# Patient Record
Sex: MALE | Race: BLACK OR AFRICAN AMERICAN | NOT HISPANIC OR LATINO | Employment: OTHER | URBAN - METROPOLITAN AREA
[De-identification: names, ages, dates, MRNs, and addresses within clinical notes are randomized per-mention and may not be internally consistent; named-entity substitution may affect disease eponyms.]

---

## 2018-03-05 ENCOUNTER — HOSPITAL ENCOUNTER (EMERGENCY)
Facility: HOSPITAL | Age: 83
Discharge: HOME | End: 2018-03-05
Attending: EMERGENCY MEDICINE
Payer: MEDICARE

## 2018-03-05 VITALS
TEMPERATURE: 99 F | SYSTOLIC BLOOD PRESSURE: 142 MMHG | OXYGEN SATURATION: 98 % | RESPIRATION RATE: 17 BRPM | BODY MASS INDEX: 29.29 KG/M2 | WEIGHT: 210 LBS | DIASTOLIC BLOOD PRESSURE: 88 MMHG | HEART RATE: 70 BPM

## 2018-03-05 DIAGNOSIS — R42 DIZZINESS: ICD-10-CM

## 2018-03-05 DIAGNOSIS — R42 LIGHTHEADEDNESS: Primary | ICD-10-CM

## 2018-03-05 LAB
ALBUMIN SERPL BCP-MCNC: 3.1 G/DL
ALP SERPL-CCNC: 64 U/L
ALT SERPL W/O P-5'-P-CCNC: 10 U/L
ANION GAP SERPL CALC-SCNC: 10 MMOL/L
AST SERPL-CCNC: 20 U/L
BACTERIA #/AREA URNS HPF: ABNORMAL /HPF
BASOPHILS # BLD AUTO: 0.1 K/UL
BASOPHILS NFR BLD: 0.9 %
BILIRUB SERPL-MCNC: 0.4 MG/DL
BILIRUB UR QL STRIP: NEGATIVE
BNP SERPL-MCNC: 184 PG/ML
BUN SERPL-MCNC: 17 MG/DL
CALCIUM SERPL-MCNC: 9 MG/DL
CHLORIDE SERPL-SCNC: 108 MMOL/L
CLARITY UR: CLEAR
CO2 SERPL-SCNC: 20 MMOL/L
COLOR UR: YELLOW
CREAT SERPL-MCNC: 1.1 MG/DL
DIFFERENTIAL METHOD: ABNORMAL
EOSINOPHIL # BLD AUTO: 0.1 K/UL
EOSINOPHIL NFR BLD: 1.1 %
ERYTHROCYTE [DISTWIDTH] IN BLOOD BY AUTOMATED COUNT: 16.3 %
EST. GFR  (AFRICAN AMERICAN): >60 ML/MIN/1.73 M^2
EST. GFR  (NON AFRICAN AMERICAN): 58 ML/MIN/1.73 M^2
GLUCOSE SERPL-MCNC: 83 MG/DL
GLUCOSE UR QL STRIP: NEGATIVE
HCT VFR BLD AUTO: 37.2 %
HGB BLD-MCNC: 12.2 G/DL
HGB UR QL STRIP: ABNORMAL
KETONES UR QL STRIP: NEGATIVE
LEUKOCYTE ESTERASE UR QL STRIP: NEGATIVE
LYMPHOCYTES # BLD AUTO: 1.9 K/UL
LYMPHOCYTES NFR BLD: 29.2 %
MCH RBC QN AUTO: 29 PG
MCHC RBC AUTO-ENTMCNC: 32.8 G/DL
MCV RBC AUTO: 88 FL
MICROSCOPIC COMMENT: ABNORMAL
MONOCYTES # BLD AUTO: 0.5 K/UL
MONOCYTES NFR BLD: 7.7 %
NEUTROPHILS # BLD AUTO: 3.9 K/UL
NEUTROPHILS NFR BLD: 61.1 %
NITRITE UR QL STRIP: NEGATIVE
PH UR STRIP: 6 [PH] (ref 5–8)
PLATELET # BLD AUTO: 235 K/UL
PMV BLD AUTO: 7.7 FL
POTASSIUM SERPL-SCNC: 4.4 MMOL/L
PROT SERPL-MCNC: 6.7 G/DL
PROT UR QL STRIP: NEGATIVE
RBC # BLD AUTO: 4.21 M/UL
RBC #/AREA URNS HPF: 3 /HPF (ref 0–4)
SODIUM SERPL-SCNC: 138 MMOL/L
SP GR UR STRIP: 1.02 (ref 1–1.03)
SQUAMOUS #/AREA URNS HPF: 2 /HPF
TROPONIN I SERPL DL<=0.01 NG/ML-MCNC: <0.006 NG/ML
URN SPEC COLLECT METH UR: ABNORMAL
UROBILINOGEN UR STRIP-ACNC: NEGATIVE EU/DL
WBC # BLD AUTO: 6.4 K/UL
WBC #/AREA URNS HPF: 2 /HPF (ref 0–5)

## 2018-03-05 PROCEDURE — 25000003 PHARM REV CODE 250: Performed by: EMERGENCY MEDICINE

## 2018-03-05 PROCEDURE — 96360 HYDRATION IV INFUSION INIT: CPT

## 2018-03-05 PROCEDURE — 36415 COLL VENOUS BLD VENIPUNCTURE: CPT

## 2018-03-05 PROCEDURE — 80053 COMPREHEN METABOLIC PANEL: CPT

## 2018-03-05 PROCEDURE — 93005 ELECTROCARDIOGRAM TRACING: CPT

## 2018-03-05 PROCEDURE — 99284 EMERGENCY DEPT VISIT MOD MDM: CPT | Mod: 25

## 2018-03-05 PROCEDURE — 85025 COMPLETE CBC W/AUTO DIFF WBC: CPT

## 2018-03-05 PROCEDURE — 96361 HYDRATE IV INFUSION ADD-ON: CPT

## 2018-03-05 PROCEDURE — 84484 ASSAY OF TROPONIN QUANT: CPT

## 2018-03-05 PROCEDURE — 81000 URINALYSIS NONAUTO W/SCOPE: CPT

## 2018-03-05 PROCEDURE — 83880 ASSAY OF NATRIURETIC PEPTIDE: CPT

## 2018-03-05 RX ORDER — DOXEPIN HYDROCHLORIDE 25 MG/1
25 CAPSULE ORAL NIGHTLY
COMMUNITY

## 2018-03-05 RX ADMIN — SODIUM CHLORIDE 1000 ML: 0.9 INJECTION, SOLUTION INTRAVENOUS at 01:03

## 2018-03-05 NOTE — ED NOTES
Dr. Kennedy is at the bedside of patient to give an update. Family is inquiring about self pay transport by EMS to go home.

## 2018-03-05 NOTE — ED NOTES
Upon discharge, patient is AAOx4, no cardiac or respiratory complications. Follow up care reviewed with patient and has been instructed to return to the ER if needed. Patient verbalized understanding and was assisted to car via wheel chair. YING CARTER

## 2018-03-05 NOTE — ED NOTES
"Presents to the ER with c/o dizziness that started this morning upon waking. Family who are at the bedside reports that patient was unable to get out of bed this morning because "He felt woozy." Patient uses a wheelchair daily, he is unable to ambulate. Patient is [place]. AAOx3. Bilateral hand  are strong and equal. Mucous membranes are pink and dry. Decreased skin tugor.  Skin is warm, dry and intact. Lungs are clear bilaterally, respirations are regular and unlabored. Denies cough, congestion, rhinorrhea or SOB. BS active x4, no tenderness with palpation, abd is soft and not distended. Denies any appetite or activity change. S1S2, capillary refill is < 2 seconds. Denies dysuria, difficulty urinating, frequency, numbness, tingling or weakness. EMMA HE    Patient arrived to ED with a dry brief in place.   "

## 2018-03-05 NOTE — ED PROVIDER NOTES
"Encounter Date: 3/5/2018    SCRIBE #1 NOTE: I, Angeli Batista, am scribing for, and in the presence of, Dr. Kennedy .       History     Chief Complaint   Patient presents with    Dizziness     s/s since this AM - "slept in" per family, then stated he was "woozy" (interpreted as dizzy by family member this AM) and hestitant to get out of bed       03/05/2018 12:33 PM     Chief complaint: Dizziness       Juan C Black is a 89 y.o. male with a hx of HTN, GERD, HLD, CA, and CAD who presents to the ED with complaints of "lightheaded" dizziness worse this morning. Per relative, pt complained of "feeling woozy" and was weaker than baseline while lying in bed. Pt uses a wheelchair and did not have enough strength to get into his wheelchair this morning. Pt denies CP, nausea, and vomiting. HPI limited secondary to pt is a poor historian. NKDA noted.       The history is provided by the patient.     Review of patient's allergies indicates:  No Known Allergies  Past Medical History:   Diagnosis Date    Cancer     prostate cancer    Coronary artery disease     GERD (gastroesophageal reflux disease)     Hyperlipidemia     Hypertension     Seasonal allergies      Past Surgical History:   Procedure Laterality Date    CARDIAC SURGERY      triple bypass    PROSTATECTOMY       History reviewed. No pertinent family history.  Social History   Substance Use Topics    Smoking status: Former Smoker     Types: Cigars    Smokeless tobacco: Not on file    Alcohol use Yes      Comment: socially     Review of Systems   Constitutional: Negative for fever.   HENT: Negative for sore throat.    Eyes: Negative for redness.   Respiratory: Negative for shortness of breath.    Cardiovascular: Negative for chest pain.   Gastrointestinal: Negative for anal bleeding, nausea and vomiting.   Genitourinary: Negative for dysuria.   Musculoskeletal: Negative for back pain.   Skin: Negative for rash.   Neurological: Positive for dizziness and " weakness.   Hematological: Does not bruise/bleed easily.       Physical Exam     Initial Vitals [03/05/18 1200]   BP Pulse Resp Temp SpO2   122/86 78 17 98.5 °F (36.9 °C) 95 %      MAP       98         Physical Exam    Nursing note and vitals reviewed.  Constitutional: He appears well-developed and well-nourished.  Non-toxic appearance. No distress.   HENT:   Head: Normocephalic and atraumatic.   Mouth/Throat: Mucous membranes are dry.   Eyes: EOM are normal. Pupils are equal, round, and reactive to light.   Neck: Normal range of motion. Neck supple. No neck rigidity. No JVD present.   Cardiovascular: Normal rate, regular rhythm, normal heart sounds and intact distal pulses. Exam reveals no gallop and no friction rub.    No murmur heard.  Pulmonary/Chest: Breath sounds normal. He has no wheezes. He has no rhonchi. He has no rales.   Abdominal: Soft. Bowel sounds are normal. He exhibits no distension. There is no tenderness. There is no rigidity, no rebound and no guarding.   Musculoskeletal: Normal range of motion.   Neurological: He is alert and oriented to person, place, and time. He has normal strength and normal reflexes. No cranial nerve deficit or sensory deficit. He exhibits normal muscle tone. Coordination normal. GCS eye subscore is 4. GCS verbal subscore is 5. GCS motor subscore is 6.   5/5 strength in BUE. 4/5 strength in BLE.   Skin: Skin is warm and dry.   Psychiatric: He has a normal mood and affect. His speech is normal and behavior is normal. He is not actively hallucinating.         ED Course   Procedures  Labs Reviewed   COMPREHENSIVE METABOLIC PANEL - Abnormal; Notable for the following:        Result Value    CO2 20 (*)     Albumin 3.1 (*)     eGFR if non  58 (*)     All other components within normal limits   URINALYSIS - Abnormal; Notable for the following:     Occult Blood UA 3+ (*)     All other components within normal limits   URINALYSIS MICROSCOPIC - Abnormal; Notable for  the following:     Bacteria, UA Few (*)     All other components within normal limits   B-TYPE NATRIURETIC PEPTIDE - Abnormal; Notable for the following:      (*)     All other components within normal limits   CBC W/ AUTO DIFFERENTIAL - Abnormal; Notable for the following:     RBC 4.21 (*)     Hemoglobin 12.2 (*)     Hematocrit 37.2 (*)     RDW 16.3 (*)     MPV 7.7 (*)     All other components within normal limits   TROPONIN I     EKG Readings: (Independently Interpreted)   NSR at a rate of 76 bpm. Sinus arrhythmia. Normal ST segments and T-waves. LAD noted. Q waves in III and AVF.          Medical Decision Making:   History:   Old Medical Records: I decided to obtain old medical records.  Initial Assessment:   Patient is a 89-year-old man who presents emergency department complaining of lightheadedness since this morning which is improved now.  Denies vertigo symptoms.  No chest pain, shortness of breath, headache, abdominal pain, diarrhea, dysuria or any other symptoms.  Laboratory evaluation was performed.  H&H is at baseline.  There is no fever, no leukocytosis to suspect infection.  Acute ischemic changes on EKG and his troponin is undetectable greater than 6 hours since the onset of symptoms.  No evidence of any malignant arrhythmias.  He has no focal neurological deficits on exam to suspect a CVA.  No grave electrolyte abnormalities or dehydration.  Patient's symptoms are improved on the road.  He did not detect any acute life-threatening or emergent etiology to his complaint.  He is to follow-up with his PCP.  Return precautions discussed.  His discharge improved in no acute distress.  Clinical Tests:   Lab Tests: Ordered and Reviewed  Radiological Study: Ordered and Reviewed  Medical Tests: Ordered and Reviewed            Scribe Attestation:   Scribe #1: I performed the above scribed service and the documentation accurately describes the services I performed. I attest to the accuracy of the  note.    I, Brent Moura, personally performed the services described in this documentation. All medical record entries made by the scribe were at my direction and in my presence.  I have reviewed the chart and agree that the record reflects my personal performance and is accurate and complete. Jaya Kennedy MD.  6:02 PM 03/05/2018             Clinical Impression:     1. Lightheadedness    2. Dizziness                               Jaya Kennedy MD  03/05/18 4130

## 2018-08-04 ENCOUNTER — HOSPITAL ENCOUNTER (EMERGENCY)
Facility: HOSPITAL | Age: 83
Discharge: HOME | End: 2018-08-04
Attending: EMERGENCY MEDICINE
Payer: MEDICARE

## 2018-08-04 VITALS
HEIGHT: 71 IN | DIASTOLIC BLOOD PRESSURE: 75 MMHG | HEART RATE: 84 BPM | RESPIRATION RATE: 20 BRPM | WEIGHT: 210 LBS | BODY MASS INDEX: 29.4 KG/M2 | SYSTOLIC BLOOD PRESSURE: 128 MMHG | OXYGEN SATURATION: 95 % | TEMPERATURE: 99 F

## 2018-08-04 DIAGNOSIS — R05.9 COUGHING: ICD-10-CM

## 2018-08-04 PROCEDURE — 99284 EMERGENCY DEPT VISIT MOD MDM: CPT | Mod: 25

## 2018-08-05 NOTE — ED PROVIDER NOTES
Encounter Date: 8/4/2018    SCRIBE #1 NOTE: I, Abbe Snyder, am scribing for, and in the presence of, Dr. Alvarez.       History     Chief Complaint   Patient presents with    Cough     for few days and worse today     08/04/2018 8:58 PM    The pt is a 89 y.o. male arriving via EMS with a past medical history of cancer, CAD, HLD, and HTN, presenting to the ED with a gradual onset of an acute non-productive cough beginning 3 days ago.  Family member reports runny nose sneezing and coughing.  No cyanosis.  All history from family.  The relative noted symptoms have been progressively worsening. Associated symptoms of wheezing and sneezing. He has a history of cigar smoking. The pt has a past surgical history that includes Cardiac surgery and Prostatectomy.      The history is provided by a relative. History limited by: All history from family, patient hard of hearing.     Review of patient's allergies indicates:  No Known Allergies  Past Medical History:   Diagnosis Date    Cancer     prostate cancer    Coronary artery disease     GERD (gastroesophageal reflux disease)     Hyperlipidemia     Hypertension     Seasonal allergies      Past Surgical History:   Procedure Laterality Date    CARDIAC SURGERY      triple bypass    PROSTATECTOMY       No family history on file.  Social History   Substance Use Topics    Smoking status: Former Smoker     Types: Cigars    Smokeless tobacco: Not on file    Alcohol use Yes      Comment: socially     Review of Systems   Reason unable to perform ROS: All history from family review of systems from family.   HENT: Positive for sneezing.    Respiratory: Positive for cough (non-productive) and wheezing.        Physical Exam     Initial Vitals   BP Pulse Resp Temp SpO2   -- -- -- -- --      MAP       --         Physical Exam    Nursing note and vitals reviewed.  Constitutional: He appears well-developed and well-nourished. He is not diaphoretic.  Non-toxic appearance. He does  not have a sickly appearance. He does not appear ill. No distress.   HENT:   Head: Normocephalic and atraumatic.   Extremely hard of hearing   Eyes: EOM are normal.   Neck: Normal range of motion. Neck supple. Normal range of motion present. No neck rigidity.   Cardiovascular: Normal rate, regular rhythm and normal heart sounds. Exam reveals no gallop and no friction rub.    No murmur heard.  Pulmonary/Chest: Breath sounds normal. No respiratory distress. He has no wheezes. He has no rhonchi. He has no rales.   Abdominal: Soft. He exhibits no distension. There is no tenderness. There is no rebound and no guarding.   Musculoskeletal: Normal range of motion. He exhibits no edema (lower extremity).   Neurological: He is alert.   Skin: Skin is warm and dry. No rash noted.         ED Course   Procedures  Labs Reviewed - No data to display       Imaging Results    None       X-Rays:   Independently Interpreted Readings:   Chest X-Ray: No acute disease seen, no consolidation, atelectasis or PTX.       Medical Decision Making:   History:   Old Medical Records: I decided to obtain old medical records.  Clinical Tests:   Radiological Study: Ordered and Reviewed            Scribe Attestation:   Scribe #1: I performed the above scribed service and the documentation accurately describes the services I performed. I attest to the accuracy of the note.    .[facility]          ED Course as of Aug 04 2215   Sat Aug 04, 2018   2104 BP: (!) 146/77 [EF]   2104 Temp: 98.7 °F (37.1 °C) [EF]   2104 Temp src: Oral [EF]   2104 Pulse: 78 [EF]   2104 Resp: 20 [EF]   2104 SpO2: 95 % [EF]   2123 SpO2: 95 % [EF]   2123 Resp: 20 [EF]   2123 Pulse: 78 [EF]   2123 Temp src: Oral [EF]   2123 Temp: 98.7 °F (37.1 °C) [EF]   2123 BP: (!) 146/77 [EF]   2139 89-year-old presents with several days of coughing.  All history from the family.  They also report runny nose sneezing and intermittent wheezing.  Symptoms certainly sound viral.  Vital signs are  unremarkable.  Physical exam demonstrates no wheezing at all no rhonchi and no rales.  Patient is well-appearing and asymptomatic in the emergency room.  No indication for any further testing.  Patient can be discharged home  [EF]      ED Course User Index  [EF] Madi Alvarez MD     Clinical Impression:   The encounter diagnosis was Coughing.              I, Dr. Alvarez, personally performed the services described in this documentation. All medical record entries made by the scribe were at my direction and in my presence.  I have reviewed the chart and agree that the record reflects my personal performance and is accurate and complete.11:18 AM 08/10/2018                   Madi Alvarez MD  08/04/18 8005       Madi Alvarez MD  08/10/18 6698